# Patient Record
Sex: MALE | Race: OTHER | HISPANIC OR LATINO | Employment: FULL TIME | URBAN - METROPOLITAN AREA
[De-identification: names, ages, dates, MRNs, and addresses within clinical notes are randomized per-mention and may not be internally consistent; named-entity substitution may affect disease eponyms.]

---

## 2019-07-10 ENCOUNTER — HOSPITAL ENCOUNTER (EMERGENCY)
Facility: HOSPITAL | Age: 34
Discharge: HOME | End: 2019-07-10
Attending: EMERGENCY MEDICINE

## 2019-07-10 ENCOUNTER — APPOINTMENT (EMERGENCY)
Dept: RADIOLOGY | Facility: HOSPITAL | Age: 34
End: 2019-07-10

## 2019-07-10 VITALS
SYSTOLIC BLOOD PRESSURE: 156 MMHG | TEMPERATURE: 98.9 F | WEIGHT: 205 LBS | DIASTOLIC BLOOD PRESSURE: 106 MMHG | HEART RATE: 97 BPM | OXYGEN SATURATION: 98 % | RESPIRATION RATE: 18 BRPM

## 2019-07-10 DIAGNOSIS — S30.1XXA CONTUSION OF ABDOMINAL WALL, INITIAL ENCOUNTER: ICD-10-CM
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
## 2019-07-10 DIAGNOSIS — M25.512 LEFT SHOULDER PAIN: ICD-10-CM

## 2019-07-10 LAB
ANION GAP BLD CALC-SCNC: 16 MMOL/L (ref 4–13)
BUN BLD-MCNC: 18 MG/DL (ref 5–25)
CA-I BLD-SCNC: 1.19 MMOL/L (ref 1.12–1.32)
CHLORIDE BLD-SCNC: 103 MMOL/L (ref 100–108)
CREAT BLD-MCNC: 1 MG/DL (ref 0.6–1.3)
GFR SERPL CREATININE-BSD FRML MDRD: 97 ML/MIN/1.73SQ M
GLUCOSE SERPL-MCNC: 100 MG/DL (ref 65–140)
HCT VFR BLD CALC: 46 % (ref 36.5–49.3)
HGB BLDA-MCNC: 15.6 G/DL (ref 12–17)
PCO2 BLD: 26 MMOL/L (ref 21–32)
POTASSIUM BLD-SCNC: 3.7 MMOL/L (ref 3.5–5.3)
SODIUM BLD-SCNC: 140 MMOL/L (ref 136–145)
SPECIMEN SOURCE: ABNORMAL

## 2019-07-10 PROCEDURE — 99284 EMERGENCY DEPT VISIT MOD MDM: CPT | Performed by: EMERGENCY MEDICINE

## 2019-07-10 PROCEDURE — 85014 HEMATOCRIT: CPT

## 2019-07-10 PROCEDURE — 70450 CT HEAD/BRAIN W/O DYE: CPT

## 2019-07-10 PROCEDURE — 74177 CT ABD & PELVIS W/CONTRAST: CPT

## 2019-07-10 PROCEDURE — 73030 X-RAY EXAM OF SHOULDER: CPT

## 2019-07-10 PROCEDURE — 80047 BASIC METABLC PNL IONIZED CA: CPT

## 2019-07-10 PROCEDURE — 99284 EMERGENCY DEPT VISIT MOD MDM: CPT

## 2019-07-10 RX ADMIN — IOHEXOL 100 ML: 350 INJECTION, SOLUTION INTRAVENOUS at 17:57

## 2019-07-10 NOTE — ED PROVIDER NOTES
History  Chief Complaint   Patient presents with   Mick Dash Fall     pt stated he was on a ladder yesterday (cleaning) 15-18ft  fell on side  since fall L arm pain and L side pain  Denies thinners  Denies headstrike  Denies loc     53-year-old male presenting for evaluation after a fall down 15-18 step ladder  Patient states he fell off a ladder landing on his left shoulder and that is where he continues to have pain  Patient has small abrasion over his left elbow as well as his left temple and a bruise over his right flank  He denies any pain in any of these areas he does not have any tenderness no range of motion issues on any of his extremities he has a perfectly normal neurologic exam   Patient has pain in his scapula to palpation but has normal range of motion of the shoulder he took ibuprofen prior to arrival   No other symptoms including nausea vomiting headaches  History provided by:  Patient   used: No    Fall   Mechanism of injury: fall    Injury location:  Shoulder/arm  Shoulder/arm injury location:  L shoulder  Arrived directly from scene: yes    Fall:     Fall occurred: ladder  Impact surface:  Dirt    Point of impact:  Back    Entrapped after fall: no    Suspicion of alcohol use: no    Prior to arrival data:     Bystander interventions:  None    Patient ambulatory at scene: yes      Blood loss:  None    Responsiveness at scene:  Alert    Loss of consciousness: no      Amnesic to event: no      Airway interventions:  None  Associated symptoms: no abdominal pain, no chest pain, no headaches, no nausea and no vomiting        None       History reviewed  No pertinent past medical history  History reviewed  No pertinent surgical history  History reviewed  No pertinent family history  I have reviewed and agree with the history as documented      Social History     Tobacco Use    Smoking status: Never Smoker    Smokeless tobacco: Never Used   Substance Use Topics    Alcohol use: Not Currently    Drug use: Not Currently        Review of Systems   Constitutional: Negative for chills, fatigue and fever  HENT: Negative for sore throat  Eyes: Negative for visual disturbance  Respiratory: Negative for shortness of breath  Cardiovascular: Negative for chest pain  Gastrointestinal: Negative for abdominal pain, constipation, diarrhea, nausea and vomiting  Genitourinary: Negative for difficulty urinating, dysuria and hematuria  Musculoskeletal: Negative for arthralgias  Skin: Negative for rash  Neurological: Negative for syncope, weakness and headaches  Hematological: Negative for adenopathy  Psychiatric/Behavioral: Negative for agitation and behavioral problems  All other systems reviewed and are negative  Physical Exam  ED Triage Vitals   Temperature Pulse Respirations Blood Pressure SpO2   07/10/19 1632 07/10/19 1632 07/10/19 1632 07/10/19 1632 07/10/19 1632   98 9 °F (37 2 °C) 97 18 (!) 156/106 98 %      Temp Source Heart Rate Source Patient Position - Orthostatic VS BP Location FiO2 (%)   07/10/19 1632 -- 07/10/19 1632 07/10/19 1632 --   Tympanic  Sitting Right arm       Pain Score       07/10/19 1636       8             Orthostatic Vital Signs  Vitals:    07/10/19 1632   BP: (!) 156/106   Pulse: 97   Patient Position - Orthostatic VS: Sitting       Physical Exam   Constitutional: He is oriented to person, place, and time  He appears well-developed and well-nourished  HENT:   Head: Normocephalic and atraumatic  Eyes: Conjunctivae and EOM are normal  No scleral icterus  Neck: Normal range of motion  Neck supple  Cardiovascular: Normal rate and regular rhythm  No murmur heard  Pulmonary/Chest: Effort normal and breath sounds normal    Abdominal: Soft  Bowel sounds are normal  There is no tenderness  Musculoskeletal: Normal range of motion  He exhibits tenderness  Arms:  Neurological: He is alert and oriented to person, place, and time  Skin: Skin is warm and dry  Psychiatric: He has a normal mood and affect  His behavior is normal    Nursing note and vitals reviewed  ED Medications  Medications   iohexol (OMNIPAQUE) 350 MG/ML injection (MULTI-DOSE) 100 mL (100 mL Intravenous Given 7/10/19 1757)       Diagnostic Studies  Results Reviewed     Procedure Component Value Units Date/Time    POCT Chem 8+ [[id number]]  (Abnormal) Collected:  07/10/19 1732    Lab Status:  Final result Specimen:  Venous Updated:  07/10/19 1738     SODIUM, I-STAT 140 mmol/l      Potassium, i-STAT 3 7 mmol/L      Chloride, istat 103 mmol/L      CO2, i-STAT 26 mmol/L      Anion Gap, i-STAT 16 mmol/L      Calcium, Ionized i-STAT 1 19 mmol/L      BUN, I-STAT 18 mg/dl      Creatinine, i-STAT 1 0 mg/dl      eGFR 97 ml/min/1 73sq m      Glucose, i-STAT 100 mg/dl      Hct, i-STAT 46 %      Hgb, i-STAT 15 6 g/dl      Specimen Type VENOUS    Narrative:       National Kid[employer] guidelines for Chronic Kidney Disease (CKD):     Stage 1 with normal or high GFR (GFR > 90 mL/min/1 73 square meters)    Stage 2 Mild CKD (GFR = 60-89 mL/min/1 73 square meters)    Stage 3A Moderate CKD (GFR = 45-59 mL/min/1 73 square meters)    Stage 3B Moderate CKD (GFR = 30-44 mL/min/1 73 square meters)    Stage 4 Severe CKD (GFR = 15-29 mL/min/1 73 square meters)    Stage 5 End Stage CKD (GFR <15 mL/min/1 73 square meters)  Note: GFR calculation is accurate only with a steady state creatinine                 CT head without contrast   Final Result by Abraham Baltazar MD (07/10 1805)      No acute intracranial abnormality  Workstation performed: [id number]         CT abdomen pelvis with contrast   Final Result by Abraham Baltazar MD (07/10 1809)      No visceral injury in the abdomen or pelvis  No hemoperitoneum  No acute fracture  Hepatic steatosis  Cholelithiasis    Tiny appendicoliths in the appendiceal tip with no evidence to suggest acute appendicitis  Workstation performed: [id number]         XR shoulder 2+ views LEFT    (Results Pending)         Procedures  Procedures        ED Course  ED Course as of Jul 11 0100   Wed Jul 10, 2019   1746 CREATININE, I-STAT: 1 0                               MDM  Number of Diagnoses or Management Options  Contusion of abdominal wall, initial encounter: new and requires workup  Fall from ladder, initial encounter: new and requires workup  Left shoulder pain: new and requires workup  Diagnosis management comments: 78-year-old male presenting after a fall down a ladder, will obtain CT head CT abdomen pelvis and x-ray of left shoulder  Patient's studies all unremarkable, discussed with him Tylenol and ibuprofen for next few days for pain  Provided a work note  Amount and/or Complexity of Data Reviewed  Tests in the radiology section of CPT®: ordered and reviewed  Review and summarize past medical records: yes  Independent visualization of images, tracings, or specimens: yes        Disposition  Final diagnoses:   Fall from ladder, initial encounter   Left shoulder pain   Contusion of abdominal wall, initial encounter     Time reflects when diagnosis was documented in both MDM as applicable and the Disposition within this note     Time User Action Codes Description Comment    7/10/2019  6:28 PM Brian  Add [K01  OLLZ] Fall, initial encounter     7/10/2019  6:28 PM Brian  Add [Y30  XXXA] Fall from ladder, initial encounter     7/10/2019  6:28 PM Brian  Modify [Z68  SZJG] Fall, initial encounter     7/10/2019  6:28 PM [facility] [Y64  XXXA] Fall from ladder, initial encounter     7/10/2019  6:28 PM Brian  Remove [X74  DLKD] Fall, initial encounter     7/10/2019  6:28 PM Brian  Add [Q21 929] Left shoulder pain     7/10/2019  6:29 PM Brian  Add [S30 1XXA] Contusion of abdominal wall, initial encounter       ED Disposition     ED Disposition Condition Date/Time Comment    Discharge Stable Wed Jul 10, 2019  6:28 PM Melody Valverde discharge to home/self care  Follow-up Information     Follow up With Specialties Details Why Contact Info Additional [address] Internal Medicine   [address] [facility] [address] [facility] [address]  [address] [place] [address], [phone]    [address] [place] [address] Emergency Department Emergency Medicine   [address]  [phone]  ED, [address] [place] [address], [id number]          There are no discharge medications for this patient  No discharge procedures on file  ED Provider  Attending physically available and evaluated Melody Valverde I managed the patient along with the ED Attending      Electronically Signed by         Paxton Mao MD  07/11/19 5174

## 2019-07-12 NOTE — ED ATTENDING ATTESTATION
Destiny Hernandez DO, saw and evaluated the patient  I have discussed the patient with the resident/non-physician practitioner and agree with the resident's/non-physician practitioner's findings, Plan of Care, and MDM as documented in the resident's/non-physician practitioner's note, except where noted  All available labs and Radiology studies were reviewed  I was present for key portions of any procedure(s) performed by the resident/non-physician practitioner and I was immediately available to provide assistance  At this point I agree with the current assessment done in the Emergency Department  I have conducted an independent evaluation of this patient a history and physical is as follows:    79-year-old male presents for a fall down a ladder  Patient fell approximately 15-18 steps  Patient small abrasions left elbow and a bruise over his right flank and pain over his left temple  Denies a loss of consciousness  Does have full range of motion  He also has pain is scapula normal range of motion of shoulder  Given mechanism and multiple areas of tenderness, will obtain CT scans of the head, C-spine, chest and pelvis  Will administer analgesia  No anticoagulation  Imaging is negative for any acute traumatic injuries  Pain control  Patient has no tenderness of the right lower quadrant  Did reveal CT findings regarding his appendix with patient and return if he develops pain is right lower quadrant      Critical Care Time  Procedures